# Patient Record
Sex: FEMALE | Race: WHITE | Employment: OTHER | URBAN - METROPOLITAN AREA
[De-identification: names, ages, dates, MRNs, and addresses within clinical notes are randomized per-mention and may not be internally consistent; named-entity substitution may affect disease eponyms.]

---

## 2020-09-09 ENCOUNTER — OFFICE VISIT (OUTPATIENT)
Dept: PRIMARY CARE CLINIC | Age: 75
End: 2020-09-09
Payer: MEDICARE

## 2020-09-09 PROCEDURE — 99211 OFF/OP EST MAY X REQ PHY/QHP: CPT | Performed by: NURSE PRACTITIONER

## 2020-09-09 PROCEDURE — G8421 BMI NOT CALCULATED: HCPCS | Performed by: NURSE PRACTITIONER

## 2020-09-09 PROCEDURE — G8428 CUR MEDS NOT DOCUMENT: HCPCS | Performed by: NURSE PRACTITIONER

## 2020-09-09 NOTE — PROGRESS NOTES
Ander Michelle received a viral test for COVID-19. They were educated on isolation and quarantine as appropriate. For any symptoms, they were directed to seek care from their PCP, given contact information to establish with a doctor, directed to an urgent care or the emergency room.

## 2020-09-12 LAB — SARS-COV-2, NAA: NOT DETECTED

## 2020-09-15 ENCOUNTER — ANESTHESIA (OUTPATIENT)
Dept: ENDOSCOPY | Age: 75
End: 2020-09-15
Payer: MEDICARE

## 2020-09-15 ENCOUNTER — ANESTHESIA EVENT (OUTPATIENT)
Dept: ENDOSCOPY | Age: 75
End: 2020-09-15
Payer: MEDICARE

## 2020-09-15 ENCOUNTER — HOSPITAL ENCOUNTER (OUTPATIENT)
Age: 75
Setting detail: OUTPATIENT SURGERY
Discharge: HOME | End: 2020-09-15
Attending: INTERNAL MEDICINE | Admitting: INTERNAL MEDICINE
Payer: MEDICARE

## 2020-09-15 VITALS
HEART RATE: 66 BPM | BODY MASS INDEX: 22.59 KG/M2 | RESPIRATION RATE: 18 BRPM | SYSTOLIC BLOOD PRESSURE: 127 MMHG | WEIGHT: 140.56 LBS | HEIGHT: 66 IN | TEMPERATURE: 97 F | OXYGEN SATURATION: 98 % | DIASTOLIC BLOOD PRESSURE: 88 MMHG

## 2020-09-15 VITALS — OXYGEN SATURATION: 98 % | DIASTOLIC BLOOD PRESSURE: 69 MMHG | SYSTOLIC BLOOD PRESSURE: 126 MMHG

## 2020-09-15 LAB — C DIFF TOXIN/ANTIGEN: NORMAL

## 2020-09-15 PROCEDURE — 87506 IADNA-DNA/RNA PROBE TQ 6-11: CPT

## 2020-09-15 PROCEDURE — 3700000000 HC ANESTHESIA ATTENDED CARE: Performed by: INTERNAL MEDICINE

## 2020-09-15 PROCEDURE — 3609010300 HC COLONOSCOPY W/BIOPSY SINGLE/MULTIPLE: Performed by: INTERNAL MEDICINE

## 2020-09-15 PROCEDURE — 87336 ENTAMOEB HIST DISPR AG IA: CPT

## 2020-09-15 PROCEDURE — 7100000010 HC PHASE II RECOVERY - FIRST 15 MIN: Performed by: INTERNAL MEDICINE

## 2020-09-15 PROCEDURE — 87493 C DIFF AMPLIFIED PROBE: CPT

## 2020-09-15 PROCEDURE — 87449 NOS EACH ORGANISM AG IA: CPT

## 2020-09-15 PROCEDURE — 2580000003 HC RX 258: Performed by: INTERNAL MEDICINE

## 2020-09-15 PROCEDURE — 87324 CLOSTRIDIUM AG IA: CPT

## 2020-09-15 PROCEDURE — 7100000000 HC PACU RECOVERY - FIRST 15 MIN: Performed by: INTERNAL MEDICINE

## 2020-09-15 PROCEDURE — 7100000001 HC PACU RECOVERY - ADDTL 15 MIN: Performed by: INTERNAL MEDICINE

## 2020-09-15 PROCEDURE — 2709999900 HC NON-CHARGEABLE SUPPLY: Performed by: INTERNAL MEDICINE

## 2020-09-15 PROCEDURE — 87328 CRYPTOSPORIDIUM AG IA: CPT

## 2020-09-15 PROCEDURE — 2500000003 HC RX 250 WO HCPCS: Performed by: NURSE ANESTHETIST, CERTIFIED REGISTERED

## 2020-09-15 PROCEDURE — 3700000001 HC ADD 15 MINUTES (ANESTHESIA): Performed by: INTERNAL MEDICINE

## 2020-09-15 PROCEDURE — 7100000011 HC PHASE II RECOVERY - ADDTL 15 MIN: Performed by: INTERNAL MEDICINE

## 2020-09-15 PROCEDURE — 88305 TISSUE EXAM BY PATHOLOGIST: CPT

## 2020-09-15 PROCEDURE — 6360000002 HC RX W HCPCS: Performed by: NURSE ANESTHETIST, CERTIFIED REGISTERED

## 2020-09-15 RX ORDER — M-VIT,TX,IRON,MINS/CALC/FOLIC 27MG-0.4MG
1 TABLET ORAL DAILY
COMMUNITY

## 2020-09-15 RX ORDER — PROPOFOL 10 MG/ML
INJECTION, EMULSION INTRAVENOUS CONTINUOUS PRN
Status: DISCONTINUED | OUTPATIENT
Start: 2020-09-15 | End: 2020-09-15 | Stop reason: SDUPTHER

## 2020-09-15 RX ORDER — ASPIRIN 81 MG/1
81 TABLET, CHEWABLE ORAL DAILY
COMMUNITY

## 2020-09-15 RX ORDER — SODIUM CHLORIDE 9 MG/ML
INJECTION, SOLUTION INTRAVENOUS CONTINUOUS
Status: DISCONTINUED | OUTPATIENT
Start: 2020-09-15 | End: 2020-09-15 | Stop reason: HOSPADM

## 2020-09-15 RX ORDER — PROPOFOL 10 MG/ML
INJECTION, EMULSION INTRAVENOUS PRN
Status: DISCONTINUED | OUTPATIENT
Start: 2020-09-15 | End: 2020-09-15 | Stop reason: SDUPTHER

## 2020-09-15 RX ORDER — LIDOCAINE HYDROCHLORIDE 20 MG/ML
INJECTION, SOLUTION INFILTRATION; PERINEURAL PRN
Status: DISCONTINUED | OUTPATIENT
Start: 2020-09-15 | End: 2020-09-15 | Stop reason: SDUPTHER

## 2020-09-15 RX ADMIN — PHENYLEPHRINE HYDROCHLORIDE 50 MCG: 10 INJECTION INTRAVENOUS at 11:57

## 2020-09-15 RX ADMIN — PROPOFOL 40 MG: 10 INJECTION, EMULSION INTRAVENOUS at 11:52

## 2020-09-15 RX ADMIN — PHENYLEPHRINE HYDROCHLORIDE 50 MCG: 10 INJECTION INTRAVENOUS at 11:54

## 2020-09-15 RX ADMIN — SODIUM CHLORIDE: 9 INJECTION, SOLUTION INTRAVENOUS at 08:47

## 2020-09-15 RX ADMIN — PHENYLEPHRINE HYDROCHLORIDE 50 MCG: 10 INJECTION INTRAVENOUS at 12:03

## 2020-09-15 RX ADMIN — PHENYLEPHRINE HYDROCHLORIDE 50 MCG: 10 INJECTION INTRAVENOUS at 12:07

## 2020-09-15 RX ADMIN — PROPOFOL 20 MG: 10 INJECTION, EMULSION INTRAVENOUS at 11:58

## 2020-09-15 RX ADMIN — LIDOCAINE HYDROCHLORIDE 100 MG: 20 INJECTION, SOLUTION INFILTRATION; PERINEURAL at 11:50

## 2020-09-15 RX ADMIN — PROPOFOL 40 MG: 10 INJECTION, EMULSION INTRAVENOUS at 11:55

## 2020-09-15 RX ADMIN — PROPOFOL 50 MCG/KG/MIN: 10 INJECTION, EMULSION INTRAVENOUS at 11:52

## 2020-09-15 ASSESSMENT — PULMONARY FUNCTION TESTS
PIF_VALUE: 2

## 2020-09-15 ASSESSMENT — PAIN - FUNCTIONAL ASSESSMENT: PAIN_FUNCTIONAL_ASSESSMENT: 0-10

## 2020-09-15 NOTE — PROGRESS NOTES
Doing well. Intake good. Respirations easy on room air. VSS.  at bedside. Moved to Phase 2 Care.
Dr. Natasha Hinton spoke with patient and  at bedside.
Sedation provided per anesthesia. See anesthesia record for medication administration and vitals.
Teaching / education initiated regarding perioperative experience, expectations, and pain management during stay. Patient verbalized understanding.
remain in the car with a cell phone for communication. If the ride is leaving the hospital grounds please make sure they are back in time for pickup. Have the patient inform the staff on arrival what their rides plans are while the patient is in the facility. At the MAIN there is one visitor allowed. Please note that the visitor policy is subject to change.

## 2020-09-15 NOTE — ANESTHESIA POSTPROCEDURE EVALUATION
Department of Anesthesiology  Postprocedure Note    Patient: Cheko Byrne  MRN: [id number]  YOB: 1945  Date of evaluation: 9/15/2020  Time:  3:54 PM     Procedure Summary     Date:  09/15/20 Room / Location:  [address] [place] [address]    Anesthesia Start:  2766 Anesthesia Stop:  4255    Procedure:  COLONOSCOPY WITH BIOPSY (N/A ) Diagnosis:  (CROHN DISEASE D50.90)    Surgeon:  Lucio Alvarez MD Responsible Provider:  Regina Titus MD    Anesthesia Type:  MAC ASA Status:  3          Anesthesia Type: MAC    Sarahi Phase I: Sarahi Score: 10    Sarahi Phase II: Sarahi Score: 10    Last vitals: Reviewed and per EMR flowsheets.        Anesthesia Post Evaluation    Patient location during evaluation: PACU  Complications: no  Cardiovascular status: hemodynamically stable  Respiratory status: acceptable

## 2020-09-15 NOTE — H&P
[facility]    Pre-operative History and Physical    Patient: Elizabeth Mejia  : 1945  CSN:     History Obtained From:  patient and/or guardian. HISTORY OF PRESENT ILLNESS:    The patient is a 67 y.o. female  here for colonoscopy to evaluate recurrence of Crohn's disease. Past Medical History:    Past Medical History:   Diagnosis Date    Crohn's     GERD (gastroesophageal reflux disease)     Hyperlipidemia     Thyroid disease      Past Surgical History:    Past Surgical History:   Procedure Laterality Date    CARPAL TUNNEL RELEASE      RIGHT X 1, LEFT X2    CHOLECYSTECTOMY      COLONOSCOPY      WITH POLYP REMOVAL  12    HYSTERECTOMY      KNEE SURGERY      RIGHT X6    NECK SURGERY      X4     Medications Prior to Admission:   No current facility-administered medications on file prior to encounter. Current Outpatient Medications on File Prior to Encounter   Medication Sig Dispense Refill    aspirin 81 MG chewable tablet Take 81 mg by mouth daily      Multiple Vitamins-Minerals (THERAPEUTIC MULTIVITAMIN-MINERALS) tablet Take 1 tablet by mouth daily      levothyroxine (SYNTHROID) 150 MCG tablet Take 150 mcg by mouth daily.  folic acid (FOLVITE) 1 MG tablet Take 1 mg by mouth daily.  esomeprazole (NEXIUM) 20 MG capsule Take 20 mg by mouth every morning (before breakfast).  duloxetine (CYMBALTA) 30 MG capsule Take 30 mg by mouth daily.  hydrocodone-acetaminophen (VICODIN) 5-500 MG per tablet Take 1 tablet by mouth See Admin Instructions. 1 TAB AT NIGHT      ondansetron (ZOFRAN ODT) 4 MG disintegrating tablet Take 1-2 tablets by mouth every 12 hours as needed for Nausea for 12 doses. 12 tablet 0    UNKNOWN TO PATIENT ONE CHRONS PILL DAY, ANOTHER ABD MED TWICE DAY           Allergies:  Patient has no known allergies.       Social History:   Social History     Tobacco Use    Smoking status: Current Every Day Smoker     Packs/day: 0.25    Smokeless tobacco: Never Used   Substance Use Topics    Alcohol use: No     Family History:   History reviewed. No pertinent family history. PHYSICAL EXAM:      BP (!) 148/83   Pulse 70   Temp 96.6 °F (35.9 °C) (Temporal)   Resp 16   Ht 5' 6\" (1.676 m)   Wt 140 lb 9 oz (63.8 kg)   SpO2 96%   BMI 22.69 kg/m²  I        Heart:   RRR, normal s1s2    Lungs:  CTA bilat,  Normal effort    Abdomen:   NT, ND      ASA Grade:  ASA 3 - Patient with moderate systemic disease with functional limitations    Mallampati Class:  Class I: Soft palate, uvula, fauces, pillars visible  __________  Class II: Soft palate, uvula, fauces visible  ____X_____   Class III: Soft palate, base of uvula visible  __________  Class IV: Hard palate only visible   __________        ASSESSMENT AND PLAN:    1. Patient is a 67 y.o. female here for colonoscopy with MAC.   2.  Procedure options, risks and benefits reviewed with patient. Patient expresses understanding.      Alex Rome MD  [address]  9/15/2020

## 2020-09-15 NOTE — ANESTHESIA PRE PROCEDURE
Department of Anesthesiology  Preprocedure Note       Name:  Cheko Byrne   Age:  67 y.o.  :  1945                                          MRN:  [id number]         Date:  9/15/2020      Surgeon: Mireya Acevedo):  Lucio Alvarez MD    Procedure: Procedure(s):  COLONOSCOPY DIAGNOSTIC    Medications prior to admission:   Prior to Admission medications    Medication Sig Start Date End Date Taking? Authorizing Provider   aspirin 81 MG chewable tablet Take 81 mg by mouth daily   Yes Historical Provider, MD   Multiple Vitamins-Minerals (THERAPEUTIC MULTIVITAMIN-MINERALS) tablet Take 1 tablet by mouth daily   Yes Historical Provider, MD   levothyroxine (SYNTHROID) 150 MCG tablet Take 150 mcg by mouth daily. Yes Historical Provider, MD   folic acid (FOLVITE) 1 MG tablet Take 1 mg by mouth daily. Yes Historical Provider, MD   esomeprazole (NEXIUM) 20 MG capsule Take 20 mg by mouth every morning (before breakfast). Yes Historical Provider, MD   duloxetine (CYMBALTA) 30 MG capsule Take 30 mg by mouth daily. Yes Historical Provider, MD   hydrocodone-acetaminophen (VICODIN) 5-500 MG per tablet Take 1 tablet by mouth See Admin Instructions. 1 TAB AT NIGHT   Yes Historical Provider, MD   ondansetron (ZOFRAN ODT) 4 MG disintegrating tablet Take 1-2 tablets by mouth every 12 hours as needed for Nausea for 12 doses. 12  Yes NELL Alvarado - CNP   UNKNOWN TO PATIENT ONE CHRONS PILL DAY, ANOTHER ABD MED TWICE DAY     Historical Provider, MD       Current medications:    Current Facility-Administered Medications   Medication Dose Route Frequency Provider Last Rate Last Dose    0.9 % sodium chloride infusion   Intravenous Continuous Lucio Alvarez  mL/hr at 09/15/20 0847         Allergies:  No Known Allergies    Problem List:  There is no problem list on file for this patient.       Past Medical History:        Diagnosis Date    Crohn's     GERD (gastroesophageal reflux disease)     Hyperlipidemia     Thyroid disease        Past Surgical History:        Procedure Laterality Date    CARPAL TUNNEL RELEASE      RIGHT X 1, LEFT X2    CHOLECYSTECTOMY      COLONOSCOPY      WITH POLYP REMOVAL  5/7/12    HYSTERECTOMY      KNEE SURGERY      RIGHT X6    NECK SURGERY      X4       Social History:    Social History     Tobacco Use    Smoking status: Current Every Day Smoker     Packs/day: 0.25    Smokeless tobacco: Never Used   Substance Use Topics    Alcohol use: No                                Ready to quit: Not Answered  Counseling given: Not Answered      Vital Signs (Current):   Vitals:    09/15/20 0834 09/15/20 0842   BP:  (!) 148/83   Pulse:  70   Resp:  16   Temp:  96.6 °F (35.9 °C)   TempSrc:  Temporal   SpO2:  96%   Weight: 140 lb 9 oz (63.8 kg)    Height: 5' 6\" (1.676 m)                                               BP Readings from Last 3 Encounters:   09/15/20 (!) 148/83       NPO Status: Time of last liquid consumption: 2100                        Time of last solid consumption: 2100                        Date of last liquid consumption: 09/14/20                        Date of last solid food consumption: 09/13/20    BMI:   Wt Readings from Last 3 Encounters:   09/15/20 140 lb 9 oz (63.8 kg)     Body mass index is 22.69 kg/m².     CBC:   Lab Results   Component Value Date    WBC 9.2 05/08/2012    RBC 3.82 05/08/2012    HGB 12.4 05/08/2012    HCT 36.9 05/08/2012    MCV 96.5 05/08/2012    RDW 13.4 05/08/2012     05/08/2012       CMP:   Lab Results   Component Value Date     07/30/2010    K 4.7 07/30/2010     07/30/2010    CO2 27 07/30/2010    BUN 16 07/30/2010    CREATININE 0.9 05/08/2012    CREATININE 0.9 07/30/2010    GFRAA >60 05/08/2012    AGRATIO 1.5 07/30/2010    GLUCOSE 120 07/30/2010    PROT 6.3 05/08/2012    CALCIUM 10.3 07/30/2010    BILITOT 0.30 05/08/2012    ALKPHOS 85 05/08/2012    AST 16 05/08/2012    ALT 12 05/08/2012       POC Tests: No results for input(s): POCGLU, POCNA, POCK, POCCL, POCBUN, POCHEMO, POCHCT in the last 72 hours. Coags: No results found for: PROTIME, INR, APTT    HCG (If Applicable): No results found for: PREGTESTUR, PREGSERUM, HCG, HCGQUANT     ABGs: No results found for: PHART, PO2ART, RHQ4XYU, HPC4TTL, BEART, S2ZYRIPY     Type & Screen (If Applicable):  No results found for: LABABO, LABRH    Drug/Infectious Status (If Applicable):  No results found for: HIV, HEPCAB    COVID-19 Screening (If Applicable):   Lab Results   Component Value Date    COVID19 NOT DETECTED 09/09/2020         Anesthesia Evaluation  Patient summary reviewed and Nursing notes reviewed  Airway: Mallampati: II        Dental:    (+) caps      Pulmonary:normal exam                               Cardiovascular:  Exercise tolerance: good (>4 METS),   (+) hyperlipidemia      ECG reviewed  Rhythm: regular    Echocardiogram reviewed               ROS comment: EF 60%     Neuro/Psych:   (+) depression/anxiety             GI/Hepatic/Renal:   (+) GERD:,           Endo/Other:    (+) hypothyroidism::., .                 Abdominal:           Vascular:                                        Anesthesia Plan      MAC     ASA 3       Induction: intravenous. Anesthetic plan and risks discussed with patient. Plan discussed with CRNA.                   Aileen Peraza MD   9/15/2020

## 2020-09-15 NOTE — BRIEF OP NOTE
Brief Postoperative Note - Full Note in Chart Review/Procedures tab       Patient: Anni Hwang  YOB: 1945  MRN: [id number]    Date of Procedure: 9/15/2020    Pre-Op Diagnosis: CROHN DISEASE D50.90    Post-Op Diagnosis: Same       Procedure(s):  COLONOSCOPY WITH BIOPSY    Surgeon(s):  Beba Pate MD    Assistant:  * No surgical staff found *    Anesthesia: Monitor Anesthesia Care    Estimated Blood Loss (mL): Minimal    Complications: None    Specimens:   ID Type Source Tests Collected by Time Destination   1 : Stool see above Stool Stool O&P SCREEN(CRYPTOSPORIDIUM/GIARDIA/E. HISTOLYTICA) #3, C DIFF TOXIN/ANTIGEN, GASTROINTESTINAL PANEL, MOLECULAR Nani Goddard RN 9/15/2020 1212    A : Bx Anastomosis ulcer Tissue Tissue SURGICAL PATHOLOGY Nani Goddard RN 9/15/2020 1208    B : Bx Random Colon Tissue Tissue SURGICAL PATHOLOGY Nani Goddard RN 9/15/2020 1209        Implants:  * No implants in log *      Drains: * No LDAs found *    Findings:  1) Normal colon s/p ileocecal resection with patent anastomosis and no evidence of active disease. 2) Colon biopsies and stool aspirate sent for appropriate studies    Rec:  1) Resume diet and meds. 2) Will plan maintenance Entyvio Rx. 3) Follow up in office in 3 months.     Electronically signed by Beba Pate MD on 9/15/2020 at 12:26 PM

## 2020-09-17 LAB
C. DIFFICILE TOXIN MOLECULAR: ABNORMAL
CRYPTOSPORIDIUM ANTIGEN, EIA: NORMAL
E HISTOLYTICA ANTIGEN STOOL: NORMAL
GIARDIA ANTIGEN, EIA: NORMAL
ORGANISM: ABNORMAL

## 2020-09-19 LAB
CAMPYLOBACTER JEJUNI/COLI PCR: NOT DETECTED
CAMPYLOBACTER UPSALIENSIS: NOT DETECTED
E COLI SHIGELLA/ENTEROINVASIVE PCR: NOT DETECTED
SALMONELLA PCR: NOT DETECTED
SHIGA TOXIN I: NOT DETECTED
SHIGA TOXIN II: NOT DETECTED

## (undated) DEVICE — BW-412T DISP COMBO CLEANING BRUSH: Brand: SINGLE USE COMBINATION CLEANING BRUSH

## (undated) DEVICE — SET VLV 3 PC AWS DISPOSABLE GRDIAN SCOPEVALET

## (undated) DEVICE — FORCEPS BX L240CM WRK CHN 2.8MM STD CAP W/ NDL MIC MESH

## (undated) DEVICE — PROCEDURE KIT ENDOSCP CUST

## (undated) DEVICE — SPECIMEN TRAP: Brand: ARGYLE

## (undated) DEVICE — SOLUTION IV IRRIG WATER 500ML POUR BRL ST 2F7113